# Patient Record
Sex: FEMALE | Race: WHITE | NOT HISPANIC OR LATINO | Employment: UNEMPLOYED | URBAN - METROPOLITAN AREA
[De-identification: names, ages, dates, MRNs, and addresses within clinical notes are randomized per-mention and may not be internally consistent; named-entity substitution may affect disease eponyms.]

---

## 2023-04-06 LAB
ALANINE AMINOTRANSFERASE (SGPT) (U/L) IN SER/PLAS: 15 U/L (ref 7–45)
ALBUMIN (G/DL) IN SER/PLAS: 4.3 G/DL (ref 3.4–5)
ALKALINE PHOSPHATASE (U/L) IN SER/PLAS: 115 U/L (ref 33–110)
ANION GAP IN SER/PLAS: 14 MMOL/L (ref 10–20)
ASPARTATE AMINOTRANSFERASE (SGOT) (U/L) IN SER/PLAS: 17 U/L (ref 9–39)
BASOPHILS (10*3/UL) IN BLOOD BY AUTOMATED COUNT: 0.08 X10E9/L (ref 0–0.1)
BASOPHILS/100 LEUKOCYTES IN BLOOD BY AUTOMATED COUNT: 0.8 % (ref 0–2)
BILIRUBIN TOTAL (MG/DL) IN SER/PLAS: 0.3 MG/DL (ref 0–1.2)
CALCIUM (MG/DL) IN SER/PLAS: 9.7 MG/DL (ref 8.6–10.3)
CARBON DIOXIDE, TOTAL (MMOL/L) IN SER/PLAS: 27 MMOL/L (ref 21–32)
CHLORIDE (MMOL/L) IN SER/PLAS: 103 MMOL/L (ref 98–107)
CREATININE (MG/DL) IN SER/PLAS: 0.68 MG/DL (ref 0.5–1.05)
EOSINOPHILS (10*3/UL) IN BLOOD BY AUTOMATED COUNT: 0.18 X10E9/L (ref 0–0.7)
EOSINOPHILS/100 LEUKOCYTES IN BLOOD BY AUTOMATED COUNT: 1.8 % (ref 0–6)
ERYTHROCYTE DISTRIBUTION WIDTH (RATIO) BY AUTOMATED COUNT: 13.8 % (ref 11.5–14.5)
ERYTHROCYTE MEAN CORPUSCULAR HEMOGLOBIN CONCENTRATION (G/DL) BY AUTOMATED: 31.6 G/DL (ref 32–36)
ERYTHROCYTE MEAN CORPUSCULAR VOLUME (FL) BY AUTOMATED COUNT: 88 FL (ref 80–100)
ERYTHROCYTES (10*6/UL) IN BLOOD BY AUTOMATED COUNT: 5.15 X10E12/L (ref 4–5.2)
GFR FEMALE: >90 ML/MIN/1.73M2
GLUCOSE (MG/DL) IN SER/PLAS: 94 MG/DL (ref 74–99)
HEMATOCRIT (%) IN BLOOD BY AUTOMATED COUNT: 45.5 % (ref 36–46)
HEMOGLOBIN (G/DL) IN BLOOD: 14.4 G/DL (ref 12–16)
IMMATURE GRANULOCYTES/100 LEUKOCYTES IN BLOOD BY AUTOMATED COUNT: 0.5 % (ref 0–0.9)
LEUKOCYTES (10*3/UL) IN BLOOD BY AUTOMATED COUNT: 9.7 X10E9/L (ref 4.4–11.3)
LYMPHOCYTES (10*3/UL) IN BLOOD BY AUTOMATED COUNT: 2.81 X10E9/L (ref 1.2–4.8)
LYMPHOCYTES/100 LEUKOCYTES IN BLOOD BY AUTOMATED COUNT: 28.9 % (ref 13–44)
MONOCYTES (10*3/UL) IN BLOOD BY AUTOMATED COUNT: 0.61 X10E9/L (ref 0.1–1)
MONOCYTES/100 LEUKOCYTES IN BLOOD BY AUTOMATED COUNT: 6.3 % (ref 2–10)
NEUTROPHILS (10*3/UL) IN BLOOD BY AUTOMATED COUNT: 6.01 X10E9/L (ref 1.2–7.7)
NEUTROPHILS/100 LEUKOCYTES IN BLOOD BY AUTOMATED COUNT: 61.7 % (ref 40–80)
PLATELETS (10*3/UL) IN BLOOD AUTOMATED COUNT: 363 X10E9/L (ref 150–450)
POTASSIUM (MMOL/L) IN SER/PLAS: 4.2 MMOL/L (ref 3.5–5.3)
PROTEIN TOTAL: 7.8 G/DL (ref 6.4–8.2)
SODIUM (MMOL/L) IN SER/PLAS: 140 MMOL/L (ref 136–145)
UREA NITROGEN (MG/DL) IN SER/PLAS: 10 MG/DL (ref 6–23)

## 2023-04-07 LAB
ACTIVATED PARTIAL THROMBOPLASTIN TIME IN PPP BY COAGULATION ASSAY: 39 SEC (ref 26–39)
INR IN PPP BY COAGULATION ASSAY: 1.1 (ref 0.9–1.1)
PROTHROMBIN TIME (PT) IN PPP BY COAGULATION ASSAY: 12.4 SEC (ref 9.8–13.4)

## 2023-04-26 ENCOUNTER — HOSPITAL ENCOUNTER (OUTPATIENT)
Dept: DATA CONVERSION | Facility: HOSPITAL | Age: 48
End: 2023-04-26
Attending: ORTHOPAEDIC SURGERY | Admitting: ORTHOPAEDIC SURGERY
Payer: OTHER GOVERNMENT

## 2023-04-26 DIAGNOSIS — E66.01 MORBID (SEVERE) OBESITY DUE TO EXCESS CALORIES (MULTI): ICD-10-CM

## 2023-04-26 DIAGNOSIS — S83.272A COMPLEX TEAR OF LATERAL MENISCUS, CURRENT INJURY, LEFT KNEE, INITIAL ENCOUNTER: ICD-10-CM

## 2023-04-26 DIAGNOSIS — K21.9 GASTRO-ESOPHAGEAL REFLUX DISEASE WITHOUT ESOPHAGITIS: ICD-10-CM

## 2023-04-26 DIAGNOSIS — M17.12 UNILATERAL PRIMARY OSTEOARTHRITIS, LEFT KNEE: ICD-10-CM

## 2023-04-26 DIAGNOSIS — S83.282A OTHER TEAR OF LATERAL MENISCUS, CURRENT INJURY, LEFT KNEE, INITIAL ENCOUNTER: ICD-10-CM

## 2023-06-30 ENCOUNTER — OFFICE VISIT (OUTPATIENT)
Dept: PRIMARY CARE | Facility: CLINIC | Age: 48
End: 2023-06-30
Payer: OTHER GOVERNMENT

## 2023-06-30 VITALS
SYSTOLIC BLOOD PRESSURE: 138 MMHG | WEIGHT: 246 LBS | DIASTOLIC BLOOD PRESSURE: 88 MMHG | OXYGEN SATURATION: 97 % | HEART RATE: 107 BPM | BODY MASS INDEX: 39.71 KG/M2

## 2023-06-30 DIAGNOSIS — Z13.21 ENCOUNTER FOR VITAMIN DEFICIENCY SCREENING: ICD-10-CM

## 2023-06-30 DIAGNOSIS — R03.0 ELEVATED BLOOD PRESSURE READING WITHOUT DIAGNOSIS OF HYPERTENSION: ICD-10-CM

## 2023-06-30 DIAGNOSIS — Z13.6 ENCOUNTER FOR SCREENING FOR CORONARY ARTERY DISEASE: ICD-10-CM

## 2023-06-30 DIAGNOSIS — M79.622 PAIN IN LEFT AXILLA: Primary | ICD-10-CM

## 2023-06-30 DIAGNOSIS — Z13.220 SCREENING FOR HYPERLIPIDEMIA: ICD-10-CM

## 2023-06-30 DIAGNOSIS — Z12.31 ENCOUNTER FOR SCREENING MAMMOGRAM FOR MALIGNANT NEOPLASM OF BREAST: ICD-10-CM

## 2023-06-30 DIAGNOSIS — Z13.1 SCREENING FOR DIABETES MELLITUS: ICD-10-CM

## 2023-06-30 DIAGNOSIS — Z12.11 SCREENING FOR COLON CANCER: ICD-10-CM

## 2023-06-30 PROBLEM — E66.9 OBESITY (BMI 30-39.9): Status: ACTIVE | Noted: 2023-06-30

## 2023-06-30 PROBLEM — M23.90 INTERNAL DERANGEMENT OF KNEE: Status: RESOLVED | Noted: 2023-06-30 | Resolved: 2023-06-30

## 2023-06-30 PROBLEM — M26.609 TMJ DYSFUNCTION: Status: RESOLVED | Noted: 2018-06-27 | Resolved: 2023-06-30

## 2023-06-30 PROBLEM — R26.9 ABNORMAL GAIT: Status: ACTIVE | Noted: 2023-06-30

## 2023-06-30 PROBLEM — K21.9 ACID REFLUX: Status: ACTIVE | Noted: 2023-06-30

## 2023-06-30 PROBLEM — R51.9 HEADACHE: Status: ACTIVE | Noted: 2017-06-19

## 2023-06-30 PROBLEM — M25.559 HIP JOINT PAIN: Status: ACTIVE | Noted: 2023-06-30

## 2023-06-30 PROBLEM — S83.289A ACUTE LATERAL MENISCAL TEAR: Status: RESOLVED | Noted: 2023-06-30 | Resolved: 2023-06-30

## 2023-06-30 PROBLEM — Z98.890 STATUS POST LATERAL MENISCECTOMY OF KNEE: Status: ACTIVE | Noted: 2023-06-30

## 2023-06-30 PROCEDURE — 3008F BODY MASS INDEX DOCD: CPT | Performed by: FAMILY MEDICINE

## 2023-06-30 PROCEDURE — 99214 OFFICE O/P EST MOD 30 MIN: CPT | Performed by: FAMILY MEDICINE

## 2023-06-30 RX ORDER — SUMATRIPTAN SUCCINATE 100 MG/1
100 TABLET ORAL
COMMUNITY
Start: 2017-06-27 | End: 2024-05-23 | Stop reason: ALTCHOICE

## 2023-06-30 ASSESSMENT — PATIENT HEALTH QUESTIONNAIRE - PHQ9
1. LITTLE INTEREST OR PLEASURE IN DOING THINGS: NOT AT ALL
2. FEELING DOWN, DEPRESSED OR HOPELESS: NOT AT ALL
SUM OF ALL RESPONSES TO PHQ9 QUESTIONS 1 AND 2: 0

## 2023-06-30 NOTE — PROGRESS NOTES
Subjective   Patient ID: Maris Carlos is a 47 y.o. female who presents for armpit pain (Pain in left armpit for 3 weeks/Patient concerned BP has been high last few doctor visits).      She is very concerned about the pain under her arm though she can feel no lump.  She is worried she has lymphoma.  She denies any breast lumps.    Since I last saw her she has had 3 knee surgeries, 2 on the right and 1 on the left.  We have had a couple virtual visits for acute illness but no in person visit for over a year.    She also is overdue for a lot of wellness as she is concerned about that.  She is bothered by her weight but has pain with pretty much any walking or exercise.  They want her to have another knee surgery but she does not want to do it.      Histories reviewed      Objective   /88   Pulse 107   Wt 112 kg (246 lb)   SpO2 97%   BMI 39.71 kg/m²    Physical Exam  Alert obese adult white female.  No acute distress  Affect pleasant  Heart RRR no murmur  Lungs CTA bilat  Neck supple, no thyromegaly, no lymphadenopathy  Right axilla is negative except for tenderness.  No lump or node can be palpated  There is a focal area of tenderness near the tail of her breast    Problem List Items Addressed This Visit    None  Visit Diagnoses       Pain in left axilla    -  Primary    Relevant Orders    CBC    Encounter for screening mammogram for malignant neoplasm of breast        Relevant Orders    BI mammo bilateral screening tomosynthesis    Screening for hyperlipidemia        Relevant Orders    Lipid Panel    Screening for diabetes mellitus        Relevant Orders    Hemoglobin A1C    Elevated blood pressure reading without diagnosis of hypertension        Relevant Orders    CBC    TSH with reflex to Free T4 if abnormal    Hemoglobin A1C    Comprehensive Metabolic Panel    Encounter for screening for coronary artery disease        Relevant Orders    CT cardiac scoring wo IV contrast    Encounter for vitamin  deficiency screening        Relevant Orders    Vitamin B12    Vitamin D, Total    Screening for colon cancer        Relevant Orders    Colonoscopy          I reassured her that she does not have a mass, and she just needs to have her screening mammogram done.  We talked a lot about other deficiencies in her wellness care and I ordered lab work, CT cardiac score and colonoscopy.  She declined Cologuard.  I would like to see her back for a wellness visit also.

## 2023-08-30 ENCOUNTER — APPOINTMENT (OUTPATIENT)
Dept: PRIMARY CARE | Facility: CLINIC | Age: 48
End: 2023-08-30
Payer: OTHER GOVERNMENT

## 2023-09-20 ENCOUNTER — APPOINTMENT (OUTPATIENT)
Dept: PRIMARY CARE | Facility: CLINIC | Age: 48
End: 2023-09-20
Payer: OTHER GOVERNMENT

## 2023-10-02 NOTE — OP NOTE
Post Operative Note:     Post-Procedure Diagnosis: Left knee lateral meniscus  tear   Procedure: 1.  Left knee arthroscopy with partial  lateral meniscectomy 40%  2.   3.   4.   5.   Surgeon: Pierce Cowan MD   Resident/Fellow/Other Assistant: Pierce Muniz PA-C   Estimated Blood Loss (mL): Minimal   Specimen: no   Findings: Complex lateral meniscus tear, lateral  compartment arthritis grade 4     Operative Report Dictated:  Dictation: not applicable - note contains Operative  Report   Note Recipients: Berenice Nicholas MD - [id number]  [PREFERRED]   Operative Report:    Indications: 47-year-old female with persistent left knee pain despite conservative treatment like to proceed surgery for left knee arthroscopy with meniscectomy  possible repair      Risks benefits and alternatives to surgery were discussed including but not limited to Infection, bleeding, neurovascular injury, pain and dysfunction, hardware related complications including cutout failure breakage, loss of function, motion, and permanent  disability as well as the cardiovascular and pulmonary complications from anesthesia including death and DVT. Patient and family accept these risks.  We discussed specifically post meniscectomy pain, incomplete pain relief, meniscus retear, progressive arthritis, intraoperative decisions in regards to other pathology, and the potential for future revision surgeries including arthroplasty    Patient identified in the preop area.  Left lower extremity marked and confirmed with patient as the operative site.  Brought back to the operating room and anesthetized under general anesthesia.  Operative lower extremity was then prepped and draped in standard sterile fashion.  Patient, site and procedure were confirmed with timeout.  Everyone in the room agreed.  Preop antibiotics were given.    We then made standard medial and lateral arthroscopy portal and the scope was introduced.  Medial joint space : Normal  meniscus and cartilage  ACL and notch were normal  Lateral joint space: Complex lateral meniscus tear.  With a horizontal cleavage was a radial component near the popliteus.  Resect approximately 40% back to a stable base with a medial lateral viewing as well as and working from anterior to posterior.   Patient focal grade 4 cartilage change in both the tibia and femur over a 1.5 to 2 cm area.  No obvious loose cartilage pieces.  The remaining remnant of meniscus was in tact along the capsule without instability.    Patellofemoral joint space: Normal patellofemoral cartilage and mechanics      Wounds were irrigated with normal saline.  Portals were closed with standard fashion.  Sterile dressings were applied.  Patient was then awoken from general anesthesia and sent to the PACU in stable condition.    Pierce Muniz PA-C was present throughout the entire case. Given the nature of the disease process and the procedure, a skilled surgical first assistant was necessary during the case. The assistant was necessary to hold retractors and to manipulate  the extremity during the procedure. A certified scrub tech was at the back table managing the instruments and supplies for the surgical case.       Electronic Signatures:  Pierce Cowan)  (Signed 05-May-2023 13:35)   Authored: Post Operative Note, Note Completion      Last Updated: 05-May-2023 13:35 by Pierce Cowan)

## 2023-12-21 ENCOUNTER — OFFICE VISIT (OUTPATIENT)
Dept: ORTHOPEDIC SURGERY | Facility: CLINIC | Age: 48
End: 2023-12-21
Payer: OTHER GOVERNMENT

## 2023-12-21 DIAGNOSIS — M17.12 PRIMARY OSTEOARTHRITIS OF LEFT KNEE: Primary | ICD-10-CM

## 2023-12-21 PROCEDURE — 99213 OFFICE O/P EST LOW 20 MIN: CPT | Performed by: ORTHOPAEDIC SURGERY

## 2023-12-21 PROCEDURE — 3008F BODY MASS INDEX DOCD: CPT | Performed by: ORTHOPAEDIC SURGERY

## 2023-12-21 RX ORDER — IBUPROFEN 800 MG/1
800 TABLET ORAL 3 TIMES DAILY
Qty: 90 TABLET | Refills: 0 | Status: SHIPPED | OUTPATIENT
Start: 2023-12-21 | End: 2024-01-20

## 2023-12-21 NOTE — PROGRESS NOTES
History of Present Illness   Patient presents for follow-up from her left knee arthritis.  She had a partial lateral meniscectomy, 40% back in April of this year.  States that a month ago she was in the kitchen and she turned and she had a lot of pain in the knee it is felt like the knee catches at times.  Denies any locking.  She like to talk about cortisone injection     Review of Systems   GENERAL: Negative for malaise, significant weight loss, fever  MUSCULOSKELETAL: see HPI  NEURO:  Negative     Physical Exam  General appearance well-nourished well-developed alert and oriented x 3  Left knee exam shows skin intact trace effusion appreciated knee range of motion within normal limit, some lateral joint line tenderness positive Apley's compression and grind test.  Positive Marshall's.  Lower EXTR gross neurovascular intact        Assessment   Left knee OA     Plan  In the past she has had good relief with ultrasound-guided and cortisone injections compared to regular cortisone injections.  About 4-1/2 months relief.  I recommended that she get 1 of these this upcoming week with Dr. Budinski, who she will see this upcoming Tuesday.   Continue with ice, ibuprofen and conservative treatment for arthritis  Medrol Dosepak    Past Medical History:   Diagnosis Date    Acute lateral meniscal tear 06/30/2023    Arthralgia of temporomandibular joint, unspecified side     TMJ pain dysfunction syndrome    Endometriosis of ovary, unspecified side, unspecified depth     Ovarian endometriosis    Headache, unspecified     Intractable headache    Internal derangement of knee 06/30/2023    Personal history of other diseases of the digestive system 09/17/2019    History of gastroesophageal reflux (GERD)    Personal history of other diseases of the digestive system     History of calculus of gallbladder       Medication Documentation Review Audit       Reviewed by Berenice Nicholas MD (Physician) on 06/30/23 at 1550      Medication  Order Taking? Sig Documenting Provider Last Dose Status   SUMAtriptan (Imitrex) 100 mg tablet [id number] Yes Take 1 tablet (100 mg) by mouth. Historical Provider, MD  Active                    No Known Allergies    Social History     Socioeconomic History    Marital status:      Spouse name: Not on file    Number of children: 2    Years of education: Not on file    Highest education level: Not on file   Occupational History    Not on file   Tobacco Use    Smoking status: Former     Types: Cigarettes     Quit date: 2006     Years since quittin.6    Smokeless tobacco: Never   Substance and Sexual Activity    Alcohol use: Not on file    Drug use: Not on file    Sexual activity: Not on file   Other Topics Concern    Not on file   Social History Narrative    Not on file     Social Determinants of Health     Financial Resource Strain: Not on file   Food Insecurity: Not on file   Transportation Needs: Not on file   Physical Activity: Not on file   Stress: Not on file   Social Connections: Not on file   Intimate Partner Violence: Not on file   Housing Stability: Not on file       Past Surgical History:   Procedure Laterality Date    OTHER SURGICAL HISTORY  2019    Laparotomy    OTHER SURGICAL HISTORY  2019    Laparoscopy    OTHER SURGICAL HISTORY  2019    Cholecystectomy

## 2023-12-26 ENCOUNTER — OFFICE VISIT (OUTPATIENT)
Dept: ORTHOPEDIC SURGERY | Facility: CLINIC | Age: 48
End: 2023-12-26
Payer: OTHER GOVERNMENT

## 2023-12-26 DIAGNOSIS — M25.562 ACUTE PAIN OF LEFT KNEE: ICD-10-CM

## 2023-12-26 PROCEDURE — 1036F TOBACCO NON-USER: CPT | Performed by: FAMILY MEDICINE

## 2023-12-26 PROCEDURE — 20611 DRAIN/INJ JOINT/BURSA W/US: CPT | Mod: LT | Performed by: FAMILY MEDICINE

## 2023-12-26 PROCEDURE — 3008F BODY MASS INDEX DOCD: CPT | Performed by: FAMILY MEDICINE

## 2023-12-26 PROCEDURE — 99213 OFFICE O/P EST LOW 20 MIN: CPT | Performed by: FAMILY MEDICINE

## 2023-12-26 PROCEDURE — 2500000005 HC RX 250 GENERAL PHARMACY W/O HCPCS: Performed by: FAMILY MEDICINE

## 2023-12-26 PROCEDURE — 2500000004 HC RX 250 GENERAL PHARMACY W/ HCPCS (ALT 636 FOR OP/ED): Performed by: FAMILY MEDICINE

## 2023-12-26 RX ORDER — LIDOCAINE HYDROCHLORIDE 10 MG/ML
6 INJECTION INFILTRATION; PERINEURAL ONCE
Status: COMPLETED | OUTPATIENT
Start: 2023-12-26 | End: 2023-12-27

## 2023-12-26 RX ORDER — TRIAMCINOLONE ACETONIDE 40 MG/ML
40 INJECTION, SUSPENSION INTRA-ARTICULAR; INTRAMUSCULAR ONCE
Status: COMPLETED | OUTPATIENT
Start: 2023-12-26 | End: 2023-12-27

## 2023-12-27 PROCEDURE — 20611 DRAIN/INJ JOINT/BURSA W/US: CPT | Performed by: FAMILY MEDICINE

## 2023-12-27 RX ADMIN — LIDOCAINE HYDROCHLORIDE 6 ML: 10 INJECTION, SOLUTION INFILTRATION; PERINEURAL at 21:52

## 2023-12-27 RX ADMIN — TRIAMCINOLONE ACETONIDE 40 MG: 40 INJECTION, SUSPENSION INTRA-ARTICULAR; INTRAMUSCULAR at 21:52

## 2023-12-28 NOTE — PROGRESS NOTES
Acute Injury New Patient Visit    CC:   Chief Complaint   Patient presents with    Left Knee - Pain       HPI: Maris is a 48 y.o.female who presents today with new complaints of worsening pain discomfort and swelling to the left knee.  She is status post meniscal surgery with Dr. Pierce Cowan, presents here today at his request for ultrasound-guided injection.  Patient has tolerated the previous ultrasound injection well.  She presents for ultrasound steroid injection here today.        Review of Systems   GENERAL: Negative for malaise, significant weight loss, fever  MUSCULOSKELETAL: See HPI  NEURO: Negative for numbness / tingling     Past Medical History  Past Medical History:   Diagnosis Date    Acute lateral meniscal tear 06/30/2023    Arthralgia of temporomandibular joint, unspecified side     TMJ pain dysfunction syndrome    Endometriosis of ovary, unspecified side, unspecified depth     Ovarian endometriosis    Headache, unspecified     Intractable headache    Internal derangement of knee 06/30/2023    Personal history of other diseases of the digestive system 09/17/2019    History of gastroesophageal reflux (GERD)    Personal history of other diseases of the digestive system     History of calculus of gallbladder       Medication review  Medication Documentation Review Audit       Reviewed by Berenice Nicholas MD (Physician) on 06/30/23 at 1550      Medication Order Taking? Sig Documenting Provider Last Dose Status   SUMAtriptan (Imitrex) 100 mg tablet [id number] Yes Take 1 tablet (100 mg) by mouth. Historical Provider, MD  Active                    Allergies  No Known Allergies    Social History  Social History     Socioeconomic History    Marital status:      Spouse name: Not on file    Number of children: 2    Years of education: Not on file    Highest education level: Not on file   Occupational History    Not on file   Tobacco Use    Smoking status: Former     Types: Cigarettes     Quit date:  2006     Years since quittin.6    Smokeless tobacco: Never   Substance and Sexual Activity    Alcohol use: Not on file    Drug use: Not on file    Sexual activity: Not on file   Other Topics Concern    Not on file   Social History Narrative    Not on file     Social Determinants of Health     Financial Resource Strain: Not on file   Food Insecurity: Not on file   Transportation Needs: Not on file   Physical Activity: Not on file   Stress: Not on file   Social Connections: Not on file   Intimate Partner Violence: Not on file   Housing Stability: Not on file       Surgical History  Past Surgical History:   Procedure Laterality Date    OTHER SURGICAL HISTORY  2019    Laparotomy    OTHER SURGICAL HISTORY  2019    Laparoscopy    OTHER SURGICAL HISTORY  2019    Cholecystectomy       Physical Exam:  GENERAL:  Patient is awake, alert, and oriented to person place and time.  Patient appears well nourished and well kept.  Affect Calm, Not Acutely Distressed.  HEENT:  Normocephalic, Atraumatic, EOMI  CARDIOVASCULAR:  Hemodynamically stable.  RESPIRATORY:  Normal respirations with unlabored breathing.  NEURO: Gross sensation intact to the lower extremities bilaterally.  Extremity: Left knee exam demonstrates skin which is warm pink well-perfused previous incision sites look normal there is a moderate-sized effusion she has tenderness palpation circumferentially about the knee with mild medial and lateral joint line pain mild patellar crepitus noted.  Calf is soft nontender.      Diagnostics: No new images today        Procedure: US Guided left knee Injection:    Before aspiration/injection, the risks  of this procedure including but not limited to;  infection, local skin irritation, skin atrophy, calcification, continued pain or discomfort, elevated blood sugar, burning, failure to relieve pain, possible late infection were all discussed with the patient.  The patient verbalized understanding and  consented to the procedure.     After informed consent was provided, patient identification was confirmed, and allergies were verified, the patient was appropriately positioned. The patient´s [Left/] Knee was evaluated in both the short and long axis via ultrasound to identify the intraarticular joint space. An effusion [was/was not] present.  The site was marked and time-out performed.      The injection site was prepped in the usual sterile manner to provide a sterile environment. The skin was anesthetized with ethyl chloride spray. The aspiration/injection was performed with standard technique. A 22G needle was passed through the skin into the joint space via the superolateral approach with direct ultrasound guidance.  [7] cc of injectate consisting of [1] cc´s [/Kenalog] and [6] cc´s 1% lidocaine without epi was instilled into the joint space.      The needle was withdrawn and the puncture site was secured with a Band-Aid. The patient tolerated the procedure well without complication.     Post-procedure discomfort can be alleviated with additional medication, ice, elevation, and rest over the first 24 hours as recommended.    L Inj/Asp: L knee on 12/27/2023 9:52 PM  Indications: pain and joint swelling  Details: 22 G needle, ultrasound-guided superolateral approach  Medications: 6 mL lidocaine (Xylocaine) injection 1 %; 40 mg triamcinolone acetonide (Kenalog-40) injection 40 mg/mL  Outcome: tolerated well, no immediate complications  Procedure, treatment alternatives, risks and benefits explained, specific risks discussed. Consent was given by the patient. Immediately prior to procedure a time out was called to verify the correct patient, procedure, equipment, support staff and site/side marked as required. Patient was prepped and draped in the usual sterile fashion.           Assessment:   Problem List Items Addressed This Visit    None  Visit Diagnoses       Acute pain of left knee        Relevant Medications     triamcinolone acetonide (Kenalog-40) injection 40 mg    lidocaine (Xylocaine) 10 mg/mL (1 %) injection 60 mg    Other Relevant Orders    Point of Care Ultrasound (Completed)             Plan: Patient received and tolerated the injection well had significant immediate relief upon discharge.  Happy to see her back as needed for any repeat injections down the road.  She can follow-up with Dr. Pierce Cowan as scheduled going forward.  Orders Placed This Encounter    Point of Care Ultrasound    triamcinolone acetonide (Kenalog-40) injection 40 mg    lidocaine (Xylocaine) 10 mg/mL (1 %) injection 60 mg      At the conclusion of the visit there were no further questions by the patient/family regarding their plan of care.  Patient was instructed to call or return with any issues, questions, or concerns regarding their injury and/or treatment plan described above.     12/27/23 at 9:49 PM - Cole C Budinsky, MD    Office: [phone]    This note was prepared using voice recognition software.  The details of this note are correct and have been reviewed, and corrected to the best of my ability.  Some grammatical errors may persist related to the Dragon software.

## 2024-04-10 ENCOUNTER — TELEPHONE (OUTPATIENT)
Dept: PRIMARY CARE | Facility: CLINIC | Age: 49
End: 2024-04-10
Payer: OTHER GOVERNMENT

## 2024-04-10 DIAGNOSIS — D12.6 TUBULOVILLOUS ADENOMA OF COLON: ICD-10-CM

## 2024-04-10 DIAGNOSIS — D12.6 ADENOMATOUS POLYP OF COLON, UNSPECIFIED PART OF COLON: Primary | ICD-10-CM

## 2024-04-10 NOTE — TELEPHONE ENCOUNTER
Pt said that when she has her colonoscopy done 9/12/23 she was told to repeat in 6-12 months and she is asking for an updated order to she can schedule please.

## 2024-05-13 ENCOUNTER — ANESTHESIA EVENT (OUTPATIENT)
Dept: GASTROENTEROLOGY | Facility: EXTERNAL LOCATION | Age: 49
End: 2024-05-13

## 2024-05-23 ENCOUNTER — HOSPITAL ENCOUNTER (OUTPATIENT)
Dept: GASTROENTEROLOGY | Facility: EXTERNAL LOCATION | Age: 49
Discharge: HOME | End: 2024-05-23
Payer: OTHER GOVERNMENT

## 2024-05-23 ENCOUNTER — TELEPHONE (OUTPATIENT)
Dept: ORTHOPEDIC SURGERY | Facility: CLINIC | Age: 49
End: 2024-05-23

## 2024-05-23 ENCOUNTER — ANESTHESIA (OUTPATIENT)
Dept: GASTROENTEROLOGY | Facility: EXTERNAL LOCATION | Age: 49
End: 2024-05-23

## 2024-05-23 VITALS
DIASTOLIC BLOOD PRESSURE: 72 MMHG | OXYGEN SATURATION: 98 % | HEART RATE: 83 BPM | HEIGHT: 66 IN | BODY MASS INDEX: 39.37 KG/M2 | TEMPERATURE: 98.1 F | WEIGHT: 245 LBS | RESPIRATION RATE: 14 BRPM | SYSTOLIC BLOOD PRESSURE: 114 MMHG

## 2024-05-23 DIAGNOSIS — D12.6 TUBULOVILLOUS ADENOMA OF COLON: ICD-10-CM

## 2024-05-23 DIAGNOSIS — M17.12 PRIMARY OSTEOARTHRITIS OF LEFT KNEE: ICD-10-CM

## 2024-05-23 DIAGNOSIS — D12.6 ADENOMATOUS POLYP OF COLON, UNSPECIFIED PART OF COLON: ICD-10-CM

## 2024-05-23 DIAGNOSIS — Z86.010 HISTORY OF COLON POLYPS: Primary | ICD-10-CM

## 2024-05-23 PROCEDURE — 45378 DIAGNOSTIC COLONOSCOPY: CPT | Performed by: STUDENT IN AN ORGANIZED HEALTH CARE EDUCATION/TRAINING PROGRAM

## 2024-05-23 RX ORDER — IBUPROFEN 100 MG/1
TABLET, CHEWABLE ORAL EVERY 8 HOURS PRN
COMMUNITY
End: 2024-05-23

## 2024-05-23 RX ORDER — SODIUM CHLORIDE 9 MG/ML
20 INJECTION, SOLUTION INTRAVENOUS CONTINUOUS
Status: DISCONTINUED | OUTPATIENT
Start: 2024-05-23 | End: 2024-05-24 | Stop reason: HOSPADM

## 2024-05-23 RX ORDER — PROPOFOL 10 MG/ML
INJECTION, EMULSION INTRAVENOUS AS NEEDED
Status: DISCONTINUED | OUTPATIENT
Start: 2024-05-23 | End: 2024-05-23

## 2024-05-23 RX ORDER — LIDOCAINE HYDROCHLORIDE 20 MG/ML
INJECTION, SOLUTION INFILTRATION; PERINEURAL AS NEEDED
Status: DISCONTINUED | OUTPATIENT
Start: 2024-05-23 | End: 2024-05-23

## 2024-05-23 RX ADMIN — PROPOFOL 50 MG: 10 INJECTION, EMULSION INTRAVENOUS at 10:31

## 2024-05-23 RX ADMIN — PROPOFOL 50 MG: 10 INJECTION, EMULSION INTRAVENOUS at 10:32

## 2024-05-23 RX ADMIN — SODIUM CHLORIDE: 9 INJECTION, SOLUTION INTRAVENOUS at 10:25

## 2024-05-23 RX ADMIN — PROPOFOL 50 MG: 10 INJECTION, EMULSION INTRAVENOUS at 10:29

## 2024-05-23 RX ADMIN — PROPOFOL 50 MG: 10 INJECTION, EMULSION INTRAVENOUS at 10:33

## 2024-05-23 RX ADMIN — PROPOFOL 100 MG: 10 INJECTION, EMULSION INTRAVENOUS at 10:25

## 2024-05-23 RX ADMIN — LIDOCAINE HYDROCHLORIDE 40 MG: 20 INJECTION, SOLUTION INFILTRATION; PERINEURAL at 10:25

## 2024-05-23 RX ADMIN — PROPOFOL 25 MG: 10 INJECTION, EMULSION INTRAVENOUS at 10:39

## 2024-05-23 RX ADMIN — PROPOFOL 50 MG: 10 INJECTION, EMULSION INTRAVENOUS at 10:37

## 2024-05-23 SDOH — HEALTH STABILITY: MENTAL HEALTH: CURRENT SMOKER: 0

## 2024-05-23 ASSESSMENT — PAIN - FUNCTIONAL ASSESSMENT
PAIN_FUNCTIONAL_ASSESSMENT: 0-10

## 2024-05-23 ASSESSMENT — COLUMBIA-SUICIDE SEVERITY RATING SCALE - C-SSRS
1. IN THE PAST MONTH, HAVE YOU WISHED YOU WERE DEAD OR WISHED YOU COULD GO TO SLEEP AND NOT WAKE UP?: NO
2. HAVE YOU ACTUALLY HAD ANY THOUGHTS OF KILLING YOURSELF?: NO
6. HAVE YOU EVER DONE ANYTHING, STARTED TO DO ANYTHING, OR PREPARED TO DO ANYTHING TO END YOUR LIFE?: NO

## 2024-05-23 ASSESSMENT — PAIN SCALES - GENERAL
PAINLEVEL_OUTOF10: 0 - NO PAIN
PAIN_LEVEL: 0
PAINLEVEL_OUTOF10: 0 - NO PAIN

## 2024-05-23 NOTE — H&P
Outpatient Hospital Procedure H&P    Patient Profile  Name Maris Carlos  Date of Birth 1975  MRN [id number]  PCP Berenice Nicholas        Diagnosis: History of polyps.  Procedure(s):  Colonoscopy.    Allergies  No Known Allergies    Past Medical History   Past Medical History:   Diagnosis Date    Acute lateral meniscal tear 06/30/2023    Arthralgia of temporomandibular joint, unspecified side     TMJ pain dysfunction syndrome    Endometriosis of ovary, unspecified side, unspecified depth     Ovarian endometriosis    Headache, unspecified     Intractable headache    Internal derangement of knee 06/30/2023    Personal history of other diseases of the digestive system 09/17/2019    History of gastroesophageal reflux (GERD)    Personal history of other diseases of the digestive system     History of calculus of gallbladder       Medication Reviewed - yes  Prior to Admission medications    Medication Sig Start Date End Date Taking? Authorizing Provider   ibuprofen 100 mg chewable tablet Chew every 8 hours if needed for mild pain (1 - 3).   Yes Historical Provider, MD   SUMAtriptan (Imitrex) 100 mg tablet Take 1 tablet (100 mg) by mouth. 6/27/17 5/23/24  Historical Provider, MD       Physical Exam  Vitals:    05/23/24 0940   BP: 137/84   Pulse: 103   Resp: 18   Temp: 36.6 °C (97.9 °F)      Weight   Vitals:    05/23/24 0940   Weight: 111 kg (245 lb)     BMI Body mass index is 39.54 kg/m².    General: A&Ox3, NAD.  HEENT: AT/NC.   CV: RRR. No murmur.  Resp: CTA bilaterally. No wheezing, rhonchi or rales.   GI: Soft, NT/ND.   Extrem: No edema.   Skin: No Jaundice.   Neuro: No focal deficits.   Psych: Normal mood and affect.        Oropharyngeal Classification IV (only hard palate visible)  ASA PS Classification 2  Sedation Plan: Deep Sedation.  Procedure Plan - pre-procedural (re)assesment completed by physician:  discharge/transfer patient when discharge criteria met    Chris Goode DO  5/23/2024 10:22  AM

## 2024-05-23 NOTE — DISCHARGE INSTRUCTIONS
Patient Instructions Post Procedure      The anesthetics, sedatives or narcotics which were given to you today will be acting in your body for the next 24 hours, so you might feel a little sleepy or groggy.  This feeling should slowly wear off. Carefully read and follow the instructions.     You received sedation today:  - Do not drive or operate any machinery or power tools of any kind.   - No alcoholic beverages today, not even beer or wine.  - Do not make any important decisions or sign any legal documents.  - No over the counter medications that contain alcohol or that may cause drowsiness.    While it is common to experience mild to moderate abdominal distention, gas, or belching after your procedure, if any of these symptoms occur following discharge from the GI Lab or within one week of having your procedure, call the [facility] to be advised whether a visit to your nearest Urgent Care or Emergency Department is indicated.  Take this paper with you if you go.   - If you develop an allergic reaction to the medications that were given during your procedure such as difficulty breathing, rash, hives, severe nausea, vomiting or lightheadedness.  - If you experience chest pain, shortness of breath, severe abdominal pain, fevers and chills.  -If you develop signs and symptoms of bleeding such as blood in your spit, if your stools turn black, tarry, or bloody  - If you have not urinated within 8 hours following your procedure.  - If your IV site becomes painful, red, inflamed, or looks infected.        Your physician recommends the additional following instructions:    -You have a contact number available for emergencies. The signs and symptoms of potential delayed complications were discussed with you. You may return to normal activities tomorrow.  -Resume your previous diet or other if specified.  -Continue your present medications.   -We are waiting for your pathology results, if applicable.  -The  findings and recommendations have been discussed with you and/or family.  - Please see Medication Reconciliation Form for new medication/medications prescribed.     If you experience any problems or have any questions following discharge from the GI Lab, please call: [phone] from 7 am- 4:30 pm.  In the event of an emergency please go to the closest Emergency Department or call Dr. Goode @ [phone].

## 2024-05-23 NOTE — ADDENDUM NOTE
Addendum  created 05/23/24 1250 by BROOKLYN Rosales-CRNA    Review and Sign - Ready for Procedure

## 2024-05-23 NOTE — ANESTHESIA POSTPROCEDURE EVALUATION
Patient: Maris Carlos    Procedure Summary       Date: 05/23/24 Room / Location: [facility]    Anesthesia Start: 1024 Anesthesia Stop:     Procedure: COLONOSCOPY Diagnosis: History of colon polyps    Scheduled Providers: GENARO Rodriguez APRN-CRNA Responsible Provider: PIPE Rosales    Anesthesia Type: MAC ASA Status: 2            Anesthesia Type: MAC    Vitals Value Taken Time   /75 05/23/24 1044   Temp 36.7 05/23/24 1044   Pulse 87 05/23/24 1044   Resp 26 05/23/24 1044   SpO2 98 05/23/24 1044       Anesthesia Post Evaluation    Patient location during evaluation: bedside  Patient participation: complete - patient participated  Level of consciousness: awake  Pain score: 0  Pain management: adequate  Airway patency: patent  Cardiovascular status: acceptable  Respiratory status: acceptable and room air  Hydration status: acceptable  Postoperative Nausea and Vomiting: none      No notable events documented.

## 2024-05-23 NOTE — ANESTHESIA PREPROCEDURE EVALUATION
Patient: Maris Carlos    Procedure Information       Date/Time: 05/23/24 1000    Scheduled providers: Chris Goode DO; BROOKLYN Rosales-CRNA    Procedure: COLONOSCOPY    Location: [facility]            Relevant Problems   GI   (+) Acid reflux      Endocrine   (+) Obesity (BMI 30-39.9)      Nervous   (+) Headache       Clinical information reviewed:   Tobacco  Allergies  Meds   Med Hx  Surg Hx  OB Status  Fam Hx  Soc   Hx        NPO Detail:  NPO/Void Status  Date of Last Liquid: 05/23/24  Time of Last Liquid: 0700  Date of Last Solid: 05/21/24  Time of Last Solid: 1800         Physical Exam    Airway  Mallampati: IV  TM distance: >3 FB  Neck ROM: full     Cardiovascular - normal exam     Dental    Pulmonary - normal exam     Abdominal   (+) obese         Anesthesia Plan    History of general anesthesia?: yes  History of complications of general anesthesia?: no    ASA 2     MAC   (Preoxygenated 2L prior to procedure.  Patient positioned self to comfort prior to sedation administered; eyes closed; continuous monitoring)  The patient is not a current smoker.    intravenous induction   Anesthetic plan and risks discussed with patient.    Plan discussed with CRNA.

## 2024-05-24 NOTE — TELEPHONE ENCOUNTER
Talked to pt and she said she couldn't print it off of MY chart, but she asked me to put it up front at [facility] for ,

## 2024-06-05 ENCOUNTER — HOSPITAL ENCOUNTER (OUTPATIENT)
Dept: RADIOLOGY | Facility: EXTERNAL LOCATION | Age: 49
Discharge: HOME | End: 2024-06-05

## 2024-06-05 ENCOUNTER — OFFICE VISIT (OUTPATIENT)
Dept: ORTHOPEDIC SURGERY | Facility: CLINIC | Age: 49
End: 2024-06-05
Payer: OTHER GOVERNMENT

## 2024-06-05 DIAGNOSIS — M17.12 PRIMARY OSTEOARTHRITIS OF LEFT KNEE: Primary | ICD-10-CM

## 2024-06-05 PROCEDURE — 99214 OFFICE O/P EST MOD 30 MIN: CPT | Performed by: FAMILY MEDICINE

## 2024-06-05 PROCEDURE — 1036F TOBACCO NON-USER: CPT | Performed by: FAMILY MEDICINE

## 2024-06-05 PROCEDURE — 20611 DRAIN/INJ JOINT/BURSA W/US: CPT | Performed by: FAMILY MEDICINE

## 2024-06-05 PROCEDURE — 99213 OFFICE O/P EST LOW 20 MIN: CPT | Performed by: FAMILY MEDICINE

## 2024-06-05 RX ORDER — PREDNISONE 50 MG/1
50 TABLET ORAL DAILY
Qty: 5 TABLET | Refills: 0 | Status: SHIPPED | OUTPATIENT
Start: 2024-06-05 | End: 2024-06-10

## 2024-06-05 NOTE — PROGRESS NOTES
Established Patient Follow-Up Visit    CC:   Chief Complaint   Patient presents with    Left Knee - Injection Follow-up     Injection 12.26.2023       HPI:  Maris is a 48 y.o. female returns here today for follow-up visit regarding: Persistent pain discomfort to the left knee she feels a lot of catching clicking about the knee.  She presents here today for an injection she states he has been dealing with it ever since the last injection back in late December.  She states she got about 3 weeks of pain relief she is getting ready to leave for a cruise later this evening for a week and would like to try the injection and a steroid.          REVIEW OF SYSTEMS:  GENERAL: Negative for malaise, significant weight loss, fever  MUSCULOSKELETAL: See HPI  NEURO: Negative for numbness / tingling       PHYSICAL EXAM:  -Neuro: Gross sensation intact to the lower extremities bilaterally.  -Extremity: Left knee exam: On inspection mild soft tissue swelling question trace effusion mild medial and lateral joint line tenderness full intact extensor mechanism flexion past 90 nontender    IMAGING: No new imaging today  Point of Care Ultrasound  These images are not reportable by radiology and will not be interpreted   by  Radiologists.      PROCEDURE: US Guided left knee Injection:    Before injection the risks of this procedure including but not limited to;  infection, local skin irritation, skin atrophy, calcification, continued pain or discomfort, elevated blood sugar, burning, failure to relieve pain, possible late infection were all discussed with the patient.  The patient verbalized understanding and consented to the procedure.     After informed consent was provided, patient identification was confirmed, and allergies were verified, the patient was appropriately positioned. The patient´s [left] Knee was evaluated in both the short and long axis via ultrasound to identify the intraarticular joint space. An effusion [trace]  present.  The site was marked and time-out performed.      The injection site was prepped in the usual sterile manner to provide a sterile environment. The skin was anesthetized with ethyl chloride spray. The injection was performed with standard technique. A 22G needle was passed through the skin into the joint space via the superolateral approach with direct ultrasound guidance.  [7] cc of injectate consisting of [1] cc´s [Kenalog] and [6] cc´s 1% lidocaine without epi was instilled into the joint space.      The needle was withdrawn and the puncture site was secured with a Band-Aid. The patient tolerated the procedure well without complication.     Post-procedure discomfort can be alleviated with additional medication, ice, elevation, and rest over the first 24 hours as recommended.  Procedures     ASSESSMENT:   Follow-up visit for:  Problem List Items Addressed This Visit    None  Visit Diagnoses       Primary osteoarthritis of left knee    -  Primary    Relevant Medications    predniSONE (Deltasone) 50 mg tablet    Other Relevant Orders    Point of Care Ultrasound (Completed)             PLAN: Patient was provided with injection here today we also provided her with a 5-day prescription for prednisone for her upcoming cruise.  Recommended she schedule a 6-week follow-up with Dr. Pierce Cowan to further discuss her ongoing and persistent pain she feels as if the the knee has likely retorn.  For now we will hold off and defer any further advanced imaging until Dr. Pierce Cowan is able to reevaluate her.  Also recommended that maybe she take her brace with her on her trip to utilize if necessary.  Orders Placed This Encounter    Point of Care Ultrasound    predniSONE (Deltasone) 50 mg tablet           At the conclusion of the visit there were no further questions by the patient/family regarding their plan of care.  Patient was instructed to call or return with any issues, questions, or concerns regarding their injury  and/or treatment plan described above.     06/05/24 at 2:44 PM - Cole C Budinsky, MD    Office: [phone]    This note was prepared using voice recognition software.  The details of this note are correct and have been reviewed, and corrected to the best of my ability.  Some grammatical errors may persist related to the Dragon software.

## 2024-07-18 ENCOUNTER — APPOINTMENT (OUTPATIENT)
Dept: ORTHOPEDIC SURGERY | Facility: CLINIC | Age: 49
End: 2024-07-18
Payer: OTHER GOVERNMENT

## 2024-08-06 ENCOUNTER — APPOINTMENT (OUTPATIENT)
Dept: PRIMARY CARE | Facility: CLINIC | Age: 49
End: 2024-08-06
Payer: OTHER GOVERNMENT

## 2024-08-06 VITALS
SYSTOLIC BLOOD PRESSURE: 123 MMHG | DIASTOLIC BLOOD PRESSURE: 83 MMHG | OXYGEN SATURATION: 98 % | WEIGHT: 253 LBS | BODY MASS INDEX: 40.84 KG/M2 | HEART RATE: 87 BPM

## 2024-08-06 DIAGNOSIS — E66.01 OBESITY, MORBID, BMI 40.0-49.9 (MULTI): Primary | ICD-10-CM

## 2024-08-06 DIAGNOSIS — R73.03 PREDIABETES: ICD-10-CM

## 2024-08-06 DIAGNOSIS — M25.561 CHRONIC PAIN OF BOTH KNEES: ICD-10-CM

## 2024-08-06 DIAGNOSIS — G89.29 CHRONIC PAIN OF BOTH KNEES: ICD-10-CM

## 2024-08-06 DIAGNOSIS — M25.562 CHRONIC PAIN OF BOTH KNEES: ICD-10-CM

## 2024-08-06 LAB — POC HEMOGLOBIN A1C: 5.7 % (ref 4.2–6.5)

## 2024-08-06 PROCEDURE — 99215 OFFICE O/P EST HI 40 MIN: CPT | Performed by: FAMILY MEDICINE

## 2024-08-06 PROCEDURE — 1036F TOBACCO NON-USER: CPT | Performed by: FAMILY MEDICINE

## 2024-08-06 PROCEDURE — 83036 HEMOGLOBIN GLYCOSYLATED A1C: CPT | Performed by: FAMILY MEDICINE

## 2024-08-06 RX ORDER — PHENTERMINE HYDROCHLORIDE 30 MG/1
30 CAPSULE ORAL
Qty: 30 CAPSULE | Refills: 0 | Status: SHIPPED | OUTPATIENT
Start: 2024-08-06 | End: 2024-09-05

## 2024-08-06 RX ORDER — METFORMIN HYDROCHLORIDE 500 MG/1
500 TABLET, EXTENDED RELEASE ORAL
Qty: 90 TABLET | Refills: 0 | Status: SHIPPED | OUTPATIENT
Start: 2024-08-06 | End: 2025-09-10

## 2024-08-06 ASSESSMENT — PATIENT HEALTH QUESTIONNAIRE - PHQ9
SUM OF ALL RESPONSES TO PHQ9 QUESTIONS 1 AND 2: 0
1. LITTLE INTEREST OR PLEASURE IN DOING THINGS: NOT AT ALL
2. FEELING DOWN, DEPRESSED OR HOPELESS: NOT AT ALL

## 2024-08-06 NOTE — PROGRESS NOTES
Subjective   Patient ID: Maris Carlos is a 48 y.o. female who presents for weight concerns.  She has so many things affecting her ability to lose weight, and is frustrated.  She has done multiple diet plans, including wt watchers.  In the past it worked.  Her current exercise is limited due to her knee issues after multiple surgeries.  She takes 800mg ibuprofen 1-2 times a day    She is in menopause.  She is still having menses but did not have period for 9 months, but now about every 4 months.  She has hot flashes and night sweats.  Her sleep is usually okay though.     Her  just had a huge cancer surgery and is not doing great.  She feels very overwhelmed.      Histories reviewed      Objective   /83   Pulse 87   Wt 115 kg (253 lb)   SpO2 98%   BMI 40.84 kg/m²    Physical Exam    Alert adult, NAD  Affect pleasant  Heart RRR no murmur  Lungs CTA bilat    HgbA1C today in office 5.7    Problem List Items Addressed This Visit    None  Visit Diagnoses         Codes    Obesity, morbid, BMI 40.0-49.9 (Multi)    -  Primary E66.01    Relevant Medications    phentermine 30 mg capsule    Other Relevant Orders    POCT glycosylated hemoglobin (Hb A1C) manually resulted (Completed)    Chronic pain of both knees     M25.561, M25.562, G89.29    Prediabetes     R73.03    Relevant Medications    metFORMIN XR (Glucophage-XR) 500 mg 24 hr tablet    Other Relevant Orders    POCT glycosylated hemoglobin (Hb A1C) manually resulted (Completed)          Fortunately the patient did think to check with her insurance company to see what would be covered.  Unfortunately Jenna seems to have the most detailed prior authorizations that I have ever seen.  She is required to try phentermine for 12 weeks and only if she fails that she can try phentermine plus Topamax.  If after 12 weeks she feels that she must try Contrave for 12 weeks and only then if she fails can she try Wegovy.  In addition, because she tested positive  for prediabetes today she has to be started on metformin or none of the other things apply.  We talked at length about treatment options.  I do not usually write prescriptions for phentermine but I am willing to help her due to her restrictive insurance issues.  She knows it is a controlled substance.  Controlled substance agreement was signed and [place].  She knows she will have to be seen every 90 days for refills.    We talked about the prediabetes in detail.  Talked about decreasing sugar and carb content in her diet and trying to get more active.  However activity is difficult because of her chronic severe knee pain.  Just about water activity versus bicycling at home or chair exercise.

## 2024-08-16 ENCOUNTER — HOSPITAL ENCOUNTER (OUTPATIENT)
Dept: RADIOLOGY | Facility: CLINIC | Age: 49
Discharge: HOME | End: 2024-08-16
Payer: OTHER GOVERNMENT

## 2024-08-16 VITALS — HEIGHT: 66 IN | BODY MASS INDEX: 39.86 KG/M2 | WEIGHT: 248 LBS

## 2024-08-16 DIAGNOSIS — Z12.31 SCREENING MAMMOGRAM FOR BREAST CANCER: ICD-10-CM

## 2024-08-16 PROCEDURE — 77067 SCR MAMMO BI INCL CAD: CPT

## 2024-08-28 NOTE — PROGRESS NOTES
History of Present Illness:  She a left knee ultrasound guided injection with Dr. Budinsky on 06/05/24 and 12/26/23. She states that the injection with Dr. Budinsky worked well. She had some soreness last week but it has improved since.     DOS: 04/26/23 left knee partial lateral meniscectomy, 40%.      Imaging:  X-ray     Assessment:  Bilateral knee OA     Plan:  Continue with ice, ibuprofen   She will call us for ultrasound-guided gel injectionsDr. Budinsky.     Physical Exam:  General appearance well-nourished well-developed alert and oriented x 3  Bilateral knee exam:   Flexion to 120  Pain along medial joint line  Skin intact   Trace effusion   Positive Apley's compression and grind test.  Positive Marshall's.    Lower EXTR gross neurovascular intact      Review of Systems:   GENERAL: Negative for malaise, significant weight loss, fever  MUSCULOSKELETAL: see HPI  NEURO:  Negative    Past Medical History:   Diagnosis Date    Acute lateral meniscal tear 06/30/2023    Arthralgia of temporomandibular joint, unspecified side     TMJ pain dysfunction syndrome    Endometriosis of ovary, unspecified side, unspecified depth     Ovarian endometriosis    Headache, unspecified     Intractable headache    Internal derangement of knee 06/30/2023    Personal history of other diseases of the digestive system 09/17/2019    History of gastroesophageal reflux (GERD)    Personal history of other diseases of the digestive system     History of calculus of gallbladder       Medication Documentation Review Audit       Reviewed by Berenice Nicholas MD (Physician) on 08/07/24 at 1328      Medication Order Taking? Sig Documenting Provider Last Dose Status   metFORMIN XR (Glucophage-XR) 500 mg 24 hr tablet [id number]  Take 1 tablet (500 mg) by mouth once daily with breakfast. Do not crush, chew, or split. Berenice Nicholas MD  Active   phentermine 30 mg capsule [id number]  Take 1 capsule (30 mg) by mouth once daily in the morning. Take  before meals. Berenice Nicholas MD  Active                    No Known Allergies    Social History     Socioeconomic History    Marital status:      Spouse name: Not on file    Number of children: 2    Years of education: Not on file    Highest education level: Not on file   Occupational History    Not on file   Tobacco Use    Smoking status: Former     Current packs/day: 0.00     Types: Cigarettes     Quit date: 2006     Years since quittin.3    Smokeless tobacco: Never   Vaping Use    Vaping status: Never Used   Substance and Sexual Activity    Alcohol use: Yes     Comment: social    Drug use: Never    Sexual activity: Not on file   Other Topics Concern    Not on file   Social History Narrative    Not on file     Social Determinants of Health     Financial Resource Strain: Not on file   Food Insecurity: Not on file   Transportation Needs: Not on file   Physical Activity: Not on file   Stress: Not on file   Social Connections: Not on file   Intimate Partner Violence: Not on file   Housing Stability: Not on file       Past Surgical History:   Procedure Laterality Date    KNEE SURGERY      OTHER SURGICAL HISTORY  2019    Laparotomy    OTHER SURGICAL HISTORY  2019    Laparoscopy    OTHER SURGICAL HISTORY  2019    Cholecystectomy       Scribe Attestation  By signing my name below, Sandra JOSEPH Scribe   attest that this documentation has been prepared under the direction and in the presence of Pierce Cowan MD.

## 2024-08-29 ENCOUNTER — OFFICE VISIT (OUTPATIENT)
Dept: ORTHOPEDIC SURGERY | Facility: CLINIC | Age: 49
End: 2024-08-29
Payer: OTHER GOVERNMENT

## 2024-08-29 DIAGNOSIS — M17.12 PRIMARY OSTEOARTHRITIS OF LEFT KNEE: Primary | ICD-10-CM

## 2024-08-29 PROCEDURE — 99213 OFFICE O/P EST LOW 20 MIN: CPT | Performed by: ORTHOPAEDIC SURGERY

## 2024-10-03 ENCOUNTER — APPOINTMENT (OUTPATIENT)
Dept: PRIMARY CARE | Facility: CLINIC | Age: 49
End: 2024-10-03
Payer: OTHER GOVERNMENT

## 2024-10-03 VITALS
WEIGHT: 248 LBS | HEART RATE: 107 BPM | SYSTOLIC BLOOD PRESSURE: 134 MMHG | HEIGHT: 66 IN | BODY MASS INDEX: 39.86 KG/M2 | DIASTOLIC BLOOD PRESSURE: 90 MMHG

## 2024-10-03 DIAGNOSIS — E66.01 OBESITY, MORBID, BMI 40.0-49.9 (MULTI): ICD-10-CM

## 2024-10-03 DIAGNOSIS — Z00.00 WELL ADULT EXAM: Primary | ICD-10-CM

## 2024-10-03 DIAGNOSIS — Z12.4 SCREENING FOR CERVICAL CANCER: ICD-10-CM

## 2024-10-03 DIAGNOSIS — Z12.31 ENCOUNTER FOR SCREENING MAMMOGRAM FOR MALIGNANT NEOPLASM OF BREAST: ICD-10-CM

## 2024-10-03 PROCEDURE — 99396 PREV VISIT EST AGE 40-64: CPT | Performed by: FAMILY MEDICINE

## 2024-10-03 PROCEDURE — 87624 HPV HI-RISK TYP POOLED RSLT: CPT

## 2024-10-03 PROCEDURE — 3008F BODY MASS INDEX DOCD: CPT | Performed by: FAMILY MEDICINE

## 2024-10-03 PROCEDURE — 90471 IMMUNIZATION ADMIN: CPT | Performed by: FAMILY MEDICINE

## 2024-10-03 PROCEDURE — 90656 IIV3 VACC NO PRSV 0.5 ML IM: CPT | Performed by: FAMILY MEDICINE

## 2024-10-03 RX ORDER — PHENTERMINE HYDROCHLORIDE 30 MG/1
30 CAPSULE ORAL
Qty: 30 CAPSULE | Refills: 1 | Status: SHIPPED | OUTPATIENT
Start: 2024-10-03 | End: 2025-01-01

## 2024-10-03 ASSESSMENT — PATIENT HEALTH QUESTIONNAIRE - PHQ9
SUM OF ALL RESPONSES TO PHQ9 QUESTIONS 1 AND 2: 0
2. FEELING DOWN, DEPRESSED OR HOPELESS: NOT AT ALL
1. LITTLE INTEREST OR PLEASURE IN DOING THINGS: NOT AT ALL

## 2024-10-03 NOTE — PROGRESS NOTES
"  Subjective   Patient ID: Maris Carlos is a 48 y.o. female who presents for Annual Exam (Patient is here for annual physical. ).    Past Medical, Surgical, Social and Family Hx reviewed-Yes    Any acute complaints?    She also needs to deal with her wt loss plan.  See note from 8/6, reviewed.    She got GERD sxs middle of the night 2 days ago and then the next day she feels very dizzy (like drunk) for an hour after she woke.    Any chronic issues addressed today or Rx refills needed?    Other than for her wt loss, no    Last pap 5 years ago  Last Mammogram 1 month ago  Colon CA screening Hx 5/2024, needs 3 yr follow up  Labs reviewed  Up to date on immunizations yes,   Dentist in the past year yes    Menstruation yes, irregular  Sexual Activity not currently        PE:  /90   Pulse 107   Ht 1.676 m (5' 6\")   Wt 112 kg (248 lb)   LMP 06/11/2024   BMI 40.03 kg/m²   Alert adult woman, NAD, obese  HEENT clear  Neck supple, No LAD  Heart RRR no murmur  Lungs CTA bilat  Abdomen benign, normal BS, soft NT/ND  Skin warm, dry, clear  Neuro grossly normal, gait WNL  Affect pleasant and appropriate, memory and judgement WNL  Breasts WNL, no masses, axilla neg   normal ext female, no rash or lesions, cervix appears normal      Assessment & Plan  Well adult exam  Reviewed vaccines and HM       Obesity, morbid, BMI 40.0-49.9 (Multi)  Continuing on the path set by her insurance company towards getting a semaglutide approved  [place] reviewed  Orders:    phentermine 30 mg capsule; Take 1 capsule (30 mg) by mouth once daily in the morning. Take before meals.    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

## 2024-10-31 ENCOUNTER — APPOINTMENT (OUTPATIENT)
Dept: PRIMARY CARE | Facility: CLINIC | Age: 49
End: 2024-10-31
Payer: OTHER GOVERNMENT

## 2024-11-06 ENCOUNTER — HOSPITAL ENCOUNTER (OUTPATIENT)
Dept: RADIOLOGY | Facility: EXTERNAL LOCATION | Age: 49
Discharge: HOME | End: 2024-11-06

## 2024-11-06 ENCOUNTER — OFFICE VISIT (OUTPATIENT)
Dept: ORTHOPEDIC SURGERY | Facility: CLINIC | Age: 49
End: 2024-11-06
Payer: OTHER GOVERNMENT

## 2024-11-06 DIAGNOSIS — M17.12 PRIMARY OSTEOARTHRITIS OF LEFT KNEE: ICD-10-CM

## 2024-11-06 PROCEDURE — 1036F TOBACCO NON-USER: CPT | Performed by: FAMILY MEDICINE

## 2024-11-06 PROCEDURE — 20611 DRAIN/INJ JOINT/BURSA W/US: CPT | Mod: LT | Performed by: FAMILY MEDICINE

## 2024-11-06 PROCEDURE — 99211 OFF/OP EST MAY X REQ PHY/QHP: CPT | Performed by: FAMILY MEDICINE

## 2024-11-06 PROCEDURE — 2500000004 HC RX 250 GENERAL PHARMACY W/ HCPCS (ALT 636 FOR OP/ED): Mod: JZ | Performed by: FAMILY MEDICINE

## 2024-11-06 NOTE — PROGRESS NOTES
Patient ID: Maris Carlos is a 48 y.o. female.    L Inj/Asp: L knee on 11/6/2024 2:34 PM  Indications: pain and joint swelling  Details: 22 G needle, ultrasound-guided superolateral approach  Medications: 88 mg hyaluronan 88 mg/4 mL  Outcome: tolerated well, no immediate complications  Procedure, treatment alternatives, risks and benefits explained, specific risks discussed. Consent was given by the patient. Immediately prior to procedure a time out was called to verify the correct patient, procedure, equipment, support staff and site/side marked as required. Patient was prepped and draped in the usual sterile fashion.           Patient presents here today for left knee Monovisc injection express her Dr. Pierce Cowan for left knee OA tolerated procedure well we will plan for follow-up as needed going forward    At the conclusion of the visit there were no further questions by the patient/family regarding their plan of care.  Patient was instructed to call or return with any issues, questions, or concerns regarding their injury and/or treatment plan described above.    This note was prepared using voice recognition software.  The details of this note are correct and have been reviewed, and corrected to the best of my ability.  Some grammatical errors may persist related to the Dragon software     Cole C. Budinsky, MD  Office: [phone]

## 2024-12-03 ENCOUNTER — APPOINTMENT (OUTPATIENT)
Dept: PRIMARY CARE | Facility: CLINIC | Age: 49
End: 2024-12-03
Payer: OTHER GOVERNMENT

## 2024-12-03 VITALS
SYSTOLIC BLOOD PRESSURE: 130 MMHG | DIASTOLIC BLOOD PRESSURE: 91 MMHG | BODY MASS INDEX: 39.06 KG/M2 | OXYGEN SATURATION: 98 % | HEART RATE: 104 BPM | WEIGHT: 242 LBS

## 2024-12-03 DIAGNOSIS — E66.01 OBESITY, MORBID, BMI 40.0-49.9 (MULTI): ICD-10-CM

## 2024-12-03 PROCEDURE — 99213 OFFICE O/P EST LOW 20 MIN: CPT | Performed by: FAMILY MEDICINE

## 2024-12-03 PROCEDURE — 1036F TOBACCO NON-USER: CPT | Performed by: FAMILY MEDICINE

## 2024-12-03 RX ORDER — TOPIRAMATE 25 MG/1
25 TABLET ORAL 2 TIMES DAILY
Qty: 60 TABLET | Refills: 2 | Status: SHIPPED | OUTPATIENT
Start: 2024-12-03 | End: 2025-06-01

## 2024-12-03 RX ORDER — PHENTERMINE HYDROCHLORIDE 30 MG/1
30 CAPSULE ORAL
Qty: 30 CAPSULE | Refills: 2 | Status: SHIPPED | OUTPATIENT
Start: 2024-12-03 | End: 2025-03-03

## 2024-12-03 NOTE — PROGRESS NOTES
Subjective   Patient ID: Maris Carlos is a 48 y.o. female who presents for Weight Loss (Pt states she is here for weight loss follow up. No other concerns at the moment.).    Her  requires a very strict protocol for her to be able to get to the semaglutides. She has lost a little weight, but not much.  Potocol reviewed.  But pt thinks perhaps the zepbound might be covered.      Histories reviewed      Objective   BP (!) 130/91   Pulse 104   Wt 110 kg (242 lb)   SpO2 98%   BMI 39.06 kg/m²    Physical Exam    Alert adult, NAD  Affect pleasant  Heart RRR no murmur  Lungs CTA bilat    Assessment & Plan  Obesity, morbid, BMI 40.0-49.9 (Multi)    Orders:    phentermine 30 mg capsule; Take 1 capsule (30 mg) by mouth once daily in the morning. Take before meals.    topiramate (Topamax) 25 mg tablet; Take 1 tablet (25 mg) by mouth 2 times a day.    tirzepatide, weight loss, (Zepbound) 2.5 mg/0.5 mL injection; Inject 2.5 mg under the skin every 7 days.    If the zepbound is covered, she will not take the topomax or the phentermine.  Follow up 3 months    Reviewed wt loss Rx options.  Discussed possible side effects of the semaglutides, how dosing starts/progresses.  Pt knows that early studies show significant chance to gain back any wt lost, after stopping the meds.  If not covered and one attempt at prior auth ineffective, pt will be responsible for calling her ins to see what her covered options are, if any.  If the Rx is covered, call in 4 weeks to let me know if prefers to stay same dose or go up a step.  Then I will do 2 months Rx and follow up in person in 3 months.  Call sooner if any problems.

## 2024-12-12 ENCOUNTER — TELEPHONE (OUTPATIENT)
Dept: PRIMARY CARE | Facility: CLINIC | Age: 49
End: 2024-12-12
Payer: OTHER GOVERNMENT

## 2025-02-05 ENCOUNTER — PATIENT MESSAGE (OUTPATIENT)
Dept: PRIMARY CARE | Facility: CLINIC | Age: 50
End: 2025-02-05
Payer: OTHER GOVERNMENT

## 2025-02-05 DIAGNOSIS — E66.9 OBESITY (BMI 30-39.9): ICD-10-CM

## 2025-02-05 RX ORDER — SEMAGLUTIDE 0.5 MG/.5ML
0.5 INJECTION, SOLUTION SUBCUTANEOUS WEEKLY
Qty: 2 ML | Refills: 1 | Status: SHIPPED | OUTPATIENT
Start: 2025-02-05 | End: 2026-02-05

## 2025-02-05 RX ORDER — SEMAGLUTIDE 0.25 MG/.5ML
0.25 INJECTION, SOLUTION SUBCUTANEOUS WEEKLY
Qty: 2 ML | Refills: 0 | Status: SHIPPED | OUTPATIENT
Start: 2025-02-05 | End: 2025-02-27

## 2025-03-04 ENCOUNTER — APPOINTMENT (OUTPATIENT)
Dept: PRIMARY CARE | Facility: CLINIC | Age: 50
End: 2025-03-04
Payer: OTHER GOVERNMENT

## 2025-03-04 VITALS
SYSTOLIC BLOOD PRESSURE: 130 MMHG | WEIGHT: 231.2 LBS | OXYGEN SATURATION: 100 % | HEART RATE: 92 BPM | BODY MASS INDEX: 37.32 KG/M2 | DIASTOLIC BLOOD PRESSURE: 90 MMHG

## 2025-03-04 DIAGNOSIS — E66.9 OBESITY (BMI 30-39.9): Primary | ICD-10-CM

## 2025-03-04 PROCEDURE — 1036F TOBACCO NON-USER: CPT | Performed by: FAMILY MEDICINE

## 2025-03-04 PROCEDURE — G2211 COMPLEX E/M VISIT ADD ON: HCPCS | Performed by: FAMILY MEDICINE

## 2025-03-04 PROCEDURE — 99213 OFFICE O/P EST LOW 20 MIN: CPT | Performed by: FAMILY MEDICINE

## 2025-03-04 RX ORDER — SEMAGLUTIDE 1 MG/.5ML
1 INJECTION, SOLUTION SUBCUTANEOUS WEEKLY
Qty: 2 ML | Refills: 2 | Status: SHIPPED | OUTPATIENT
Start: 2025-03-04 | End: 2026-03-04

## 2025-03-17 NOTE — PROGRESS NOTES
Subjective   Patient ID: Maris Carlos is a 49 y.o. female who presents for Weight Loss (Weight loss follow up, everything going well. No complaints. ).  No side effects from the Rx  Pt under a lot of stress with 's cancer and recent surgery    Histories reviewed      Objective   /90   Pulse 92   Wt 105 kg (231 lb 3.2 oz)   SpO2 100%   BMI 37.32 kg/m²    Physical Exam    Alert adult, NAD  Affect pleasant  Heart RRR no murmur  Lungs CTA bilat    Assessment & Plan  Obesity (BMI 30-39.9)  Increase dose, follow up in 3-4 months  Orders:    semaglutide, weight loss, (Wegovy) 1 mg/0.5 mL pen injector; Inject 1 mg under the skin 1 (one) time per week.

## 2025-03-17 NOTE — ASSESSMENT & PLAN NOTE
Increase dose, follow up in 3-4 months  Orders:    semaglutide, weight loss, (Wegovy) 1 mg/0.5 mL pen injector; Inject 1 mg under the skin 1 (one) time per week.

## 2025-06-02 ENCOUNTER — APPOINTMENT (OUTPATIENT)
Dept: PRIMARY CARE | Facility: CLINIC | Age: 50
End: 2025-06-02
Payer: OTHER GOVERNMENT

## 2025-06-02 VITALS
WEIGHT: 220 LBS | DIASTOLIC BLOOD PRESSURE: 82 MMHG | HEART RATE: 78 BPM | SYSTOLIC BLOOD PRESSURE: 128 MMHG | OXYGEN SATURATION: 100 % | BODY MASS INDEX: 35.51 KG/M2

## 2025-06-02 DIAGNOSIS — E66.9 OBESITY (BMI 30-39.9): ICD-10-CM

## 2025-06-02 PROCEDURE — 1036F TOBACCO NON-USER: CPT | Performed by: FAMILY MEDICINE

## 2025-06-02 PROCEDURE — 99213 OFFICE O/P EST LOW 20 MIN: CPT | Performed by: FAMILY MEDICINE

## 2025-06-02 RX ORDER — SEMAGLUTIDE 1 MG/.5ML
1 INJECTION, SOLUTION SUBCUTANEOUS WEEKLY
Qty: 2 ML | Refills: 3 | Status: SHIPPED | OUTPATIENT
Start: 2025-06-02 | End: 2026-06-02

## 2025-06-02 ASSESSMENT — PATIENT HEALTH QUESTIONNAIRE - PHQ9
2. FEELING DOWN, DEPRESSED OR HOPELESS: NOT AT ALL
SUM OF ALL RESPONSES TO PHQ9 QUESTIONS 1 AND 2: 0
1. LITTLE INTEREST OR PLEASURE IN DOING THINGS: NOT AT ALL

## 2025-06-02 NOTE — PROGRESS NOTES
Subjective   Patient ID: Maris Carlos is a 49 y.o. female who presents for Weight Loss (Follow up).  She feels the wegovy is working very well for her with minimal side effects.  She would like to continue, and try a slightly higher dose.  She is losing weight.      Histories reviewed      Objective   /82   Pulse 78   Wt 99.8 kg (220 lb)   SpO2 100%   BMI 35.51 kg/m²    Physical Exam    Alert adult, NAD  Affect pleasant  Heart RRR no murmur  Lungs CTA bilat    Assessment & Plan  Obesity (BMI 30-39.9)  Follow up 4 months or prn  Orders:    semaglutide, weight loss, (Wegovy) 1 mg/0.5 mL pen injector; Inject 1 mg under the skin 1 (one) time per week.

## 2025-06-02 NOTE — ASSESSMENT & PLAN NOTE
Follow up 4 months or prn  Orders:    semaglutide, weight loss, (Wegovy) 1 mg/0.5 mL pen injector; Inject 1 mg under the skin 1 (one) time per week.

## 2025-06-04 ENCOUNTER — OFFICE VISIT (OUTPATIENT)
Dept: ORTHOPEDIC SURGERY | Facility: CLINIC | Age: 50
End: 2025-06-04
Payer: OTHER GOVERNMENT

## 2025-06-04 ENCOUNTER — HOSPITAL ENCOUNTER (OUTPATIENT)
Dept: RADIOLOGY | Facility: EXTERNAL LOCATION | Age: 50
Discharge: HOME | End: 2025-06-04

## 2025-06-04 DIAGNOSIS — M17.12 PRIMARY OSTEOARTHRITIS OF LEFT KNEE: Primary | ICD-10-CM

## 2025-06-04 PROCEDURE — 20611 DRAIN/INJ JOINT/BURSA W/US: CPT | Mod: LT | Performed by: FAMILY MEDICINE

## 2025-06-04 PROCEDURE — 99214 OFFICE O/P EST MOD 30 MIN: CPT | Performed by: FAMILY MEDICINE

## 2025-06-04 PROCEDURE — 2500000004 HC RX 250 GENERAL PHARMACY W/ HCPCS (ALT 636 FOR OP/ED): Performed by: FAMILY MEDICINE

## 2025-06-04 RX ADMIN — TRIAMCINOLONE ACETONIDE 40 MG: 400 INJECTION, SUSPENSION INTRA-ARTICULAR; INTRAMUSCULAR at 14:22

## 2025-06-04 RX ADMIN — LIDOCAINE HYDROCHLORIDE 6 ML: 10 INJECTION, SOLUTION INFILTRATION; PERINEURAL at 14:22

## 2025-06-05 ENCOUNTER — APPOINTMENT (OUTPATIENT)
Dept: PRIMARY CARE | Facility: CLINIC | Age: 50
End: 2025-06-05
Payer: OTHER GOVERNMENT

## 2025-06-05 RX ORDER — TRIAMCINOLONE ACETONIDE 40 MG/ML
40 INJECTION, SUSPENSION INTRA-ARTICULAR; INTRAMUSCULAR
Status: COMPLETED | OUTPATIENT
Start: 2025-06-04 | End: 2025-06-04

## 2025-06-05 RX ORDER — LIDOCAINE HYDROCHLORIDE 10 MG/ML
6 INJECTION, SOLUTION INFILTRATION; PERINEURAL
Status: COMPLETED | OUTPATIENT
Start: 2025-06-04 | End: 2025-06-04

## 2025-06-05 NOTE — PROGRESS NOTES
Follow-Up Patient Visit: Lower Extremity Injury  Assessment & Plan  Left knee osteoarthritis  Chronic osteoarthritis with persistent pain despite previous hyaluronic acid injection. Discussed PRP injection as an alternative, not covered by insurance. Cortisone injections provide temporary relief.  - Administer cortisone injection to the left knee.  - Advise ice application today and tomorrow.  - Instruct to avoid hot tub soaks or baths for two days.  - Consider follow-up with Dr. Cowan or second opinion if pain returns within three months.  - Repeat cortisone injection every three months if effective.    Orders Placed This Encounter    L Inj/Asp: L knee    Point of Care Ultrasound     1. Primary osteoarthritis of left knee      L Inj/Asp: L knee on 6/4/2025 2:22 PM  Indications: pain and joint swelling  Details: 22 G needle, ultrasound-guided superolateral approach  Medications: 6 mL lidocaine 10 mg/mL (1 %); 40 mg triamcinolone acetonide 40 mg/mL  Outcome: tolerated well, no immediate complications  Procedure, treatment alternatives, risks and benefits explained, specific risks discussed. Consent was given by the patient. Immediately prior to procedure a time out was called to verify the correct patient, procedure, equipment, support staff and site/side marked as required. Patient was prepped and draped in the usual sterile fashion.         At the conclusion of the visit there were no further questions by the patient/family regarding their plan of care.  Patient was instructed to call or return with any issues, questions, or concerns regarding their injury and/or treatment plan described above.    PHYSICAL EXAM:  Neuro: Gross sensation intact to the lower extremities bilaterally.  Lower extremity: Left knee exam: The affected knee was examined and inspected and was tender to the touch along the medial and lateral aspect with catching, locking or mechanical symptoms. The skin was intact without breakdown or open  wound. Old incisions if present were healed. There was a mild Marshall exam seen with some evidence of instability & weakness in the collateral ligaments with varus/valgus stress & laxity in the anterior or posterior planes. There was a negative Lachman´s test, pivot shift test and posterior drawer sign with no foot drop, numbness or tingling. Sensation, reflexes and pulses in the foot and ankle are preserved. There was a small effusion. Range of motion showed good straight leg raise with flexion to 95 degrees and extension to 0 degrees. The patient had the ability to bear weight, but with discomfort. The patient´s gait was antalgic secondary to the discomfort.  Physical Exam        IMAGING:   Results  Procedure: Intra-articular corticosteroid injection  Description: Injection administered to the left knee. Cleaned the area post-injection.  Point of Care Ultrasound  These images are not reportable by radiology and will not be interpreted   by  Radiologists.       HPI  Patient ID: Maris Carlos is a 49 y.o. female who presents for Injections and Osteoarthritis of the Left Knee (Sarbjti inj/S/P monovisc nj 11/6/24).  History of Present Illness  Maris Carlos is a 49 year old female with left knee osteoarthritis who presents for a steroid injection.    She has ongoing pain in the lower part of her left knee due to osteoarthritis. A previous gel injection in November 2024 did not provide any relief. She has been delaying further treatment but wanted to receive the injection before her upcoming vacation.    She has a history of meniscus surgery on her left knee and mentions the possibility of needing a knee replacement in the future. Her right knee also has issues, having undergone a distal femoral osteotomy, which limits her ability to straighten it fully and bear weight. Her knee problems affect her daily activities.            This medical note was created with the assistance of artificial intelligence (AI) for  documentation purposes. The content has been reviewed and confirmed by the healthcare provider for accuracy and completeness. Patient consented to the use of audio recording and use of AI during their visit.     06/05/25 at 9:23 AM - Cole C Budinsky, MD  Office:  [phone]

## 2025-09-29 ENCOUNTER — APPOINTMENT (OUTPATIENT)
Dept: PRIMARY CARE | Facility: CLINIC | Age: 50
End: 2025-09-29
Payer: OTHER GOVERNMENT

## 2025-10-25 ENCOUNTER — APPOINTMENT (OUTPATIENT)
Dept: RADIOLOGY | Facility: HOSPITAL | Age: 50
End: 2025-10-25
Payer: OTHER GOVERNMENT